# Patient Record
Sex: MALE | Race: WHITE | Employment: STUDENT | URBAN - NONMETROPOLITAN AREA
[De-identification: names, ages, dates, MRNs, and addresses within clinical notes are randomized per-mention and may not be internally consistent; named-entity substitution may affect disease eponyms.]

---

## 2018-01-24 ENCOUNTER — OFFICE VISIT - GICH (OUTPATIENT)
Dept: FAMILY MEDICINE | Facility: OTHER | Age: 23
End: 2018-01-24

## 2018-01-24 ENCOUNTER — HOSPITAL ENCOUNTER (OUTPATIENT)
Dept: RADIOLOGY | Facility: OTHER | Age: 23
End: 2018-01-24
Attending: NURSE PRACTITIONER

## 2018-01-24 ENCOUNTER — HISTORY (OUTPATIENT)
Dept: FAMILY MEDICINE | Facility: OTHER | Age: 23
End: 2018-01-24

## 2018-01-24 DIAGNOSIS — M25.522 PAIN IN LEFT ELBOW: ICD-10-CM

## 2018-01-24 DIAGNOSIS — S42.402A CLOSED FRACTURE OF LOWER END OF LEFT HUMERUS: ICD-10-CM

## 2018-01-24 DIAGNOSIS — S53.402A SPRAIN OF LEFT ELBOW: ICD-10-CM

## 2018-02-09 VITALS
RESPIRATION RATE: 16 BRPM | WEIGHT: 163 LBS | HEIGHT: 68 IN | TEMPERATURE: 98.1 F | SYSTOLIC BLOOD PRESSURE: 118 MMHG | DIASTOLIC BLOOD PRESSURE: 72 MMHG | BODY MASS INDEX: 24.71 KG/M2 | HEART RATE: 56 BPM

## 2018-02-13 NOTE — NURSING NOTE
Patient Information     Patient Name MRN Pablo Prasad [id number] Male 1995      Nursing Note by Chiquita López at 2018 12:00 PM     Author:  Chiquita López Service:  (none) Author Type:  (none)     Filed:  2018 12:30 PM Encounter Date:  2018 Status:  Signed     :  Chiquita López            Patient states injured left elbow yesterday. Hurts worse when moving.   Chiquita López LPN...................2018   12:03 PM

## 2018-02-13 NOTE — PROGRESS NOTES
"Patient Information     Patient Name MRN Sex Pablo Hickman [id number] Male 1995      Progress Notes by Patricia Salcedo NP at 2018 12:00 PM     Author:  Patricia Salcedo NP Service:  (none) Author Type:  PHYS- Nurse Practitioner     Filed:  2018  7:15 PM Encounter Date:  2018 Status:  Signed     :  Patricia Salcedo NP (PHYS- Nurse Practitioner)            Nursing Notes:   Chiquita López  2018 12:30 PM  Signed  Patient states injured left elbow yesterday. Hurts worse when moving.   Chiquita López LPN...................2018   12:03 PM     SUBJECTIVE:    Pablo Treviño is a 22 y.o. male who presents for elbow pain    Elbow Injury    Incident onset: 18. [place], arm hyperflexed and twisted). The injury mechanism was a direct blow and a fall. The pain is present in the left elbow and left forearm. The quality of the pain is described as burning. The pain does not radiate. The pain is at a severity of 3/10. Pertinent negatives include no numbness or tingling. The symptoms are aggravated by lifting, movement and palpation. He has tried ice for the symptoms. The treatment provided mild relief.       Current Outpatient Prescriptions on File Prior to Visit       Medication  Sig Dispense Refill     albuterol HFA (PROAIR HFA) 90 mcg/actuation inhaler INHALE 1 TO 2 PUFFS EVERY 4 TO 6 HOURS AS NEEDED. 8.5 g 0     No current facility-administered medications on file prior to visit.        REVIEW OF SYSTEMS:  Review of Systems   Neurological: Negative for tingling and numbness.       OBJECTIVE:  /72 (Cuff Site: Right Arm, Position: Sitting, Cuff Size: Adult Regular)  Pulse 56  Temp 98.1  F (36.7  C) (Tympanic)  Resp 16  Ht 1.727 m (5' 8\")  Wt 73.9 kg (163 lb)  BMI 24.78 kg/m2    EXAM:   Physical Exam   Constitutional: He is well-developed, well-nourished, and in no distress.   HENT:   Head: Normocephalic and " atraumatic.   Eyes: Conjunctivae are normal.   Neck: Neck supple.   Cardiovascular: Normal rate.    Pulmonary/Chest: Effort normal. No respiratory distress.   Musculoskeletal:        Left shoulder: Normal.        Left elbow: He exhibits decreased range of motion. He exhibits no swelling, no effusion and no deformity. Tenderness found. Medial epicondyle tenderness noted. No lateral epicondyle and no olecranon process tenderness noted.        Left wrist: Normal.        Left upper arm: Normal.        Left forearm: He exhibits tenderness.   Skin: Skin is warm and dry. No rash noted.   Psychiatric: Mood and affect normal.   Nursing note and vitals reviewed.    Completed Elbow xray.  I personally reviewed the xray. There was no obvious fracture noted upon initial read of xray.  Final read pending by radiology.    ASSESSMENT/PLAN:    ICD-10-CM    1. Elbow pain, left M25.522 XR ELBOW 3 VIEWS LEFT      SLING      AMB CONSULT TO ORTHOPEDICS (NON-SPINE)   2. Sprain of left elbow, initial encounter S53.402A naproxen (NAPROSYN) 500 mg tablet      SLING      AMB CONSULT TO ORTHOPEDICS (NON-SPINE)   3. Occult closed fracture of left elbow, initial encounter S42.402A AMB CONSULT TO ORTHOPEDICS (NON-SPINE)        Plan:  Radiology did note a potential for occult fracture. Patient was ace wrapped and placed in a sling. No movement until seen in orthopedics. Ice, rest, NSAIDs, F/u if needed sooner.       HARPREET GRIFFIN NP ....................  1/24/2018   7:14 PM

## 2018-02-13 NOTE — PATIENT INSTRUCTIONS
Patient Information     Patient Name MRN Sex Pablo Hickman [id number] Male 1995      Patient Instructions by Patricia Salcedo NP at 2018 12:00 PM     Author:  Patricia Salcedo NP Service:  (none) Author Type:  PHYS- Nurse Practitioner     Filed:  2018 12:55 PM Encounter Date:  2018 Status:  Signed     :  Patricia Salcedo NP (PHYS- Nurse Practitioner)            Elbow Sprain   ________________________________________________________________________  KEY POINTS    An elbow sprain is an injury to one or more of the ligaments in your elbow. Ligaments are strong bands of tissue that connect one bone to another to form the joints.    You will need to change or stop doing the activities that cause pain until the ligament has healed.    Treatment may include a sling or splint, stretching or strengthening exercises, or surgery.    Warm-up exercises and stretching before activities can help prevent injuries.  ________________________________________________________________________  What is an elbow sprain?   An elbow sprain is an injury to one or more of the ligaments in your elbow. Ligaments are strong bands of tissue that connect one bone to another to form the joints. When a ligament is injured, it can be stretched, partially torn, or completely torn.   What is the cause?  A sprain is caused by a sudden activity that twists or tears a ligament, like a fall onto your elbow or onto your outstretched arm.   What are the symptoms?   Symptoms may include:    Pain    Swelling and bruising    Trouble bending and straightening your elbow  How is it diagnosed?   Your healthcare provider will examine you and ask about your symptoms, activities, and medical history. You may have X-rays or other scans.  How is it treated?   You will need to change or stop doing the activities that cause pain until the ligament has healed.   Your healthcare provider may recommend stretching and strengthening  exercises to help you heal more quickly.   Your provider may give you a sling or splint to keep the elbow from moving while it heals.   If your elbow ligaments are completely torn, you may need surgery.  The pain often gets better within a few weeks with self-care, but some injuries may take several months or longer to heal. It s important to follow all of your healthcare provider s instructions.  How can I take care of myself?  To reduce swelling and pain for the first few days after the injury:    Put an ice pack, gel pack, or package of frozen vegetables wrapped in a cloth on the injured area every 3 to 4 hours for up to 20 minutes at a time.    Keep your elbow up on a pillow when you sit or lie down.    Take nonprescription pain medicine, such as acetaminophen, ibuprofen, or naproxen. Read the label and take as directed. Unless recommended by your healthcare provider, you should not take these medicines for more than 10 days.    Nonsteroidal anti-inflammatory medicines (NSAIDs), such as ibuprofen, naproxen, and aspirin, may cause stomach bleeding and other problems. These risks increase with age. Putting an NSAID gel on your skin can decrease pain, with fewer side effects than pills taken by mouth. Ask your healthcare provider if a prescription is right for you.    Acetaminophen may cause liver damage or other problems. Unless recommended by your provider, don't take more than 3000 milligrams (mg) in 24 hours. To make sure you don t take too much, check other medicines you take to see if they also contain acetaminophen. Ask your provider if you need to avoid drinking alcohol while taking this medicine.  Follow your healthcare provider's instructions, including any exercises recommended by your provider. Ask your provider:    How and when you will get your test results    How long it will take to recover    If there are activities you should avoid and when you can return to your normal activities    How to take  care of yourself at home    What symptoms or problems you should watch for and what to do if you have them  Make sure you know when you should come back for a checkup. Keep all appointments for provider visits or tests.  How can I help prevent an elbow sprain?   Warm-up exercises and stretching before activities can help prevent injuries.   Follow safety rules and use any protective equipment recommended for your work or sport.

## 2019-11-22 ENCOUNTER — OFFICE VISIT (OUTPATIENT)
Dept: FAMILY MEDICINE | Facility: OTHER | Age: 24
End: 2019-11-22
Attending: PHYSICIAN ASSISTANT
Payer: COMMERCIAL

## 2019-11-22 VITALS
HEART RATE: 70 BPM | DIASTOLIC BLOOD PRESSURE: 80 MMHG | WEIGHT: 196.8 LBS | SYSTOLIC BLOOD PRESSURE: 132 MMHG | HEIGHT: 69 IN | OXYGEN SATURATION: 98 % | BODY MASS INDEX: 29.15 KG/M2 | RESPIRATION RATE: 18 BRPM | TEMPERATURE: 98.5 F

## 2019-11-22 DIAGNOSIS — G89.29 CHRONIC MIDLINE LOW BACK PAIN WITHOUT SCIATICA: Primary | ICD-10-CM

## 2019-11-22 DIAGNOSIS — M54.50 CHRONIC MIDLINE LOW BACK PAIN WITHOUT SCIATICA: Primary | ICD-10-CM

## 2019-11-22 PROCEDURE — 99213 OFFICE O/P EST LOW 20 MIN: CPT | Performed by: FAMILY MEDICINE

## 2019-11-22 ASSESSMENT — PAIN SCALES - GENERAL: PAINLEVEL: MILD PAIN (2)

## 2019-11-22 ASSESSMENT — MIFFLIN-ST. JEOR: SCORE: 1878.06

## 2019-11-22 NOTE — PATIENT INSTRUCTIONS
Patient Education   When You Have Low Back Pain  Caring for Your Back  You are not alone.  Low back pain is very common. Nearly half of all adults have low back pain in any given year.  The good news is that back pain is rarely a danger to your health. Most people can manage their back pain on their own and about half of them start feeling better within 2 weeks. In 9 out of 10 cases, low back pain goes away or no longer limits daily activity within 6 weeks.  Your outlook is good!  Your symptoms tell us that your low back pain is most likely not a danger to you. Most of the time we do not know the exact cause of low back pain, even if you see a doctor or have an MRI. However, treatment can still work without knowing the cause of the pain. Less than 1 in 100 people need surgery for their back pain.  What can I do about my low back pain?  There are three things you can do to ease low back pain and help it go away.    Use heat or cold packs.    Take medicine as directed.    Use positions, movements and exercises.  Using heat or cold packs  Try cold packs or gentle heat to ease your pain. Use whichever gives you the most relief. Apply the cold pack or heat for 15 minutes at a time, as often as needed.  Taking medicine    If your doctor has prescribed medicine, be sure to follow the directions.    If you take over-the-counter medicine, read and follow the directions.    Talk to your doctor if you have any questions.  Using positions, movements and exercises  Research tells us that moving your joints and muscles can help you recover from back pain. Such activity should be simple and gentle.  Use the positions in the photos as well as walking to help relieve your pain. Try taking a short walk every 3 to 4 hours during the day. Walk for a few minutes inside your home or take longer walks outside, on a treadmill or at a mall. Slowly increase the amount of time you walk.  Expect discomfort when you begin, but it should  lessen as your back starts to heal. When your back feels better, walk daily to keep your back and body healthy.  Finding a comfortable position  When your back pain is new, certain positions will ease your pain. Gently try each of the positions below until you find one that is helpful. Once you find a position of comfort, use it as often as you like when you are resting. You will recover faster if you combine rest with activity.         When should I call my doctor?  Your back pain should improve over the first couple of weeks. As it improves, you should be able to return to your normal activities. But call your doctor if:    You have a sudden change in your ability to control?your bladder or bowels.    You feel tingling in your groin or legs.    The pain spreads down your leg and into your foot.    Your toes, feet or leg muscles feel weak.    You feel generally unwell or sick.    Your pain does not get better or gets worse.    For informational purposes only. Not to replace the advice of your health care provider.  Copyright   2013 [employer] [facility]. All rights reserved. [employer] [id number] - REV 03/16.

## 2019-11-22 NOTE — NURSING NOTE
"Chief Complaint   Patient presents with     Back Pain     Has scoliosis. Back pain has gotten worse yesterday and has been elevated since than. He usually has discomfort but this is more than normal.     Initial There were no vitals taken for this visit. Estimated body mass index is 24.78 kg/m  as calculated from the following:    Height as of 1/24/18: 1.727 m (5' 8\").    Weight as of 1/24/18: 73.9 kg (163 lb).    Medication Reconciliation: complete      Pablo Hannon LPN  "

## 2019-11-22 NOTE — PROGRESS NOTES
"Nursing Notes:   Pablo Hannon LPN  11/22/2019  1:54 PM  Signed  Chief Complaint   Patient presents with     Back Pain     Has scoliosis. Back pain has gotten worse yesterday and has been elevated since than. He usually has discomfort but this is more than normal.     Initial There were no vitals taken for this visit. Estimated body mass index is 24.78 kg/m  as calculated from the following:    Height as of 1/24/18: 1.727 m (5' 8\").    Weight as of 1/24/18: 73.9 kg (163 lb).    Medication Reconciliation: complete      Pablo APOLONIAFab Hannon LPN    SUBJECTIVE:   Pablo Treviño is a 24 year old male who complains of low back pain chronically and will often have an achy back which he attributes to being told he has scoliosis. No recent lifting and has not been exercising with weight gain noted.  Drives to [place] for school so spending lots of time in the car or sitting and studying.     Social History     Social History Narrative    Attending [employer] fall of 2019.         OBJECTIVE:  /80   Pulse 70   Temp 98.5  F (36.9  C) (Tympanic)   Resp 18   Ht 1.753 m (5' 9\")   Wt 89.3 kg (196 lb 12.8 oz)   SpO2 98%   BMI 29.06 kg/m     Patient appears to be in mild pain and moves without pain behaviors Lumbosacral spine area reveals no local tenderness or mass.  Painful and reduced LS ROM noted with forward flexion causing pain in area of upper lumbar spine centrally. NO obvious scoliosis or curvature.     ASSESSMENT:   1. Chronic midline low back pain without sciatica          PLAN:  For acute pain, rest, intermittent application of heat (do not sleep on heating pad), analgesics over the counter are recommended. Ibuprofen in adequate doses discussed.  Discussed longer term treatment plan of prn NSAID's and discussed a home back care exercise program with flexion exercise routine. Proper lifting with avoidance of heavy lifting discussed. Consider Physical Therapy or chiropractic care and XRay studies if not " improving. Call or return to clinic prn if these symptoms worsen or fail to improve as anticipated.    Kaia Davis MD  2:13 PM 11/22/2019

## 2020-01-09 ENCOUNTER — APPOINTMENT (OUTPATIENT)
Dept: GENERAL RADIOLOGY | Facility: HOSPITAL | Age: 25
End: 2020-01-09
Attending: EMERGENCY MEDICINE
Payer: COMMERCIAL

## 2020-01-09 ENCOUNTER — HOSPITAL ENCOUNTER (EMERGENCY)
Facility: HOSPITAL | Age: 25
Discharge: HOME | End: 2020-01-09
Attending: EMERGENCY MEDICINE | Admitting: EMERGENCY MEDICINE
Payer: COMMERCIAL

## 2020-01-09 VITALS
DIASTOLIC BLOOD PRESSURE: 83 MMHG | TEMPERATURE: 98.6 F | RESPIRATION RATE: 16 BRPM | WEIGHT: 190 LBS | HEART RATE: 94 BPM | OXYGEN SATURATION: 97 % | BODY MASS INDEX: 28.06 KG/M2 | SYSTOLIC BLOOD PRESSURE: 148 MMHG

## 2020-01-09 DIAGNOSIS — S52.322A CLOSED DISPLACED TRANSVERSE FRACTURE OF SHAFT OF LEFT RADIUS, INITIAL ENCOUNTER: Primary | ICD-10-CM

## 2020-01-09 PROCEDURE — 25505 CLTX RDL SHFT FX W/MNPJ: CPT | Mod: LT

## 2020-01-09 PROCEDURE — 99284 EMERGENCY DEPT VISIT MOD MDM: CPT | Mod: 25

## 2020-01-09 PROCEDURE — 73090 X-RAY EXAM OF FOREARM: CPT | Mod: TC,LT

## 2020-01-09 PROCEDURE — 25000125 ZZHC RX 250: Performed by: EMERGENCY MEDICINE

## 2020-01-09 PROCEDURE — 40000986 XR FOREARM PORT LT 2 VW: Mod: TC,LT

## 2020-01-09 PROCEDURE — 25505 CLTX RDL SHFT FX W/MNPJ: CPT | Mod: 54 | Performed by: EMERGENCY MEDICINE

## 2020-01-09 RX ORDER — ETOMIDATE 2 MG/ML
16 INJECTION INTRAVENOUS ONCE
Status: COMPLETED | OUTPATIENT
Start: 2020-01-09 | End: 2020-01-09

## 2020-01-09 RX ADMIN — ETOMIDATE 16 MG: 2 INJECTION, SOLUTION INTRAVENOUS at 20:26

## 2020-01-09 ASSESSMENT — ENCOUNTER SYMPTOMS
FEVER: 0
SHORTNESS OF BREATH: 0
ABDOMINAL PAIN: 0

## 2020-01-09 NOTE — ED AVS SNAPSHOT
[facility]  [address]  [place] [address]  Phone:  [phone]                                    Pablo Treviño   MRN: [id number]    Department:  [facility]   Date of Visit:  1/9/2020           After Visit Summary Signature Page    I have received my discharge instructions, and my questions have been answered. I have discussed any challenges I see with this plan with the nurse or doctor.    ..........................................................................................................................................  Patient/Patient Representative Signature      ..........................................................................................................................................  Patient Representative Print Name and Relationship to Patient    ..................................................               ................................................  Date                                   Time    ..........................................................................................................................................  Reviewed by Signature/Title    ...................................................              ..............................................  Date                                               Time          22EPIC Rev 08/18

## 2020-01-10 NOTE — ED TRIAGE NOTES
Pt was snowboarding at [facility] and was going over a jump and fell back and landed on outstretched left arm. Pt stated arm was moderately deformed before  put air cast on. Denies any LOC. Denies any neck pain.

## 2020-01-10 NOTE — ED PROVIDER NOTES
History     Chief Complaint   Patient presents with     Arm Pain     HPI  Pablo Treviño is a 24 year old male who presents to the emergency department with complaints of deformed painful left forearm after fall while skiing.  He fell on outstretched left arm while skiing.  No loss of consciousness and did not hit his head.  He is not on any blood thinners.  Denies any other injuries.    Allergies:  No Known Allergies    Problem List:    Patient Active Problem List    Diagnosis Date Noted     Chronic midline low back pain without sciatica 11/22/2019     Priority: Medium        Past Medical History:    No past medical history on file.    Past Surgical History:    No past surgical history on file.    Family History:    No family history on file.    Social History:  Marital Status:  Single [1]  Social History     Tobacco Use     Smoking status: Former Smoker     Smokeless tobacco: Former User     Types: Chew   Substance Use Topics     Alcohol use: Yes     Drug use: Not Currently     Types: Other     Comment: Drug use: No        Medications:    No current outpatient medications on file.        Review of Systems   Constitutional: Negative for fever.   Respiratory: Negative for shortness of breath.    Cardiovascular: Negative for chest pain.   Gastrointestinal: Negative for abdominal pain.   All other systems reviewed and are negative.      Physical Exam   BP: 131/96  Pulse: 107  Heart Rate: 93  Temp: 98.1  F (36.7  C)  Resp: 16  Weight: 86.2 kg (190 lb)  SpO2: 98 %      Physical Exam  Vitals signs and nursing note reviewed.   Constitutional:       General: He is not in acute distress.     Appearance: He is well-developed. He is not diaphoretic.   HENT:      Head: Normocephalic and atraumatic.   Eyes:      Pupils: Pupils are equal, round, and reactive to light.   Cardiovascular:      Rate and Rhythm: Normal rate and regular rhythm.      Heart sounds: Normal heart sounds.   Pulmonary:      Effort: Pulmonary effort is  normal. No respiratory distress.      Breath sounds: Normal breath sounds. No stridor.   Abdominal:      General: Bowel sounds are normal. There is no distension.      Tenderness: There is no abdominal tenderness.   Musculoskeletal:         General: Tenderness and deformity present.        Arms:    Neurological:      Mental Status: He is alert and oriented to person, place, and time.      Cranial Nerves: No cranial nerve deficit.         ED Course   Patient evaluated and x-rays ordered.  Offered pain medication but he declined preferring to wait and see results of the x-ray first.    [facility]    -Fracture  Date/Time: 1/9/2020 8:38 PM  Performed by: Porfirio Her MD  Authorized by: Porfirio Her MD       INJURY      Injury location:  Forearm    Forearm injury location:  L forearm    Forearm fracture type: radial shaft      PRE PROCEDURE ASSESSMENT      Neurological function: normal      Distal perfusion: normal      Range of motion: reduced      PROCEDURE DETAILS:     Manipulation performed: yes      Skeletal traction used: yes      Reduction successful: yes      X-ray confirmed reduction: yes      Immobilization:  Sling    Supplies used:  Cotton padding and plaster    POST PROCEDURE ASSESSMENT      Neurological function: normal      Distal perfusion: normal      Range of motion: improved      PROCEDURE   Patient Tolerance:  Patient tolerated the procedure well with no immediate complications          Results for orders placed or performed during the hospital encounter of 01/09/20 (from the past 24 hour(s))   XR Forearm Port Left 2 Views    Narrative    PROCEDURE:  XR FOREARM PORT LT 2 VW    HISTORY: Deformed left forearm after fall.    COMPARISON:  None.    TECHNIQUE:  2 views of the left forearm were obtained.    FINDINGS:  There is a transverse fracture distal third of the radial  shaft. Major distal fracture fragment is displaced toward the radial  aspect of the wrist by 7 mm. The ulna is  intact.       Impression    IMPRESSION: Fracture distal third of the radial shaft      DIYA LEMUS MD   XR Forearm Port Left 2 Views    Narrative    PROCEDURE:  XR FOREARM PORT LT 2 VW    HISTORY: post reduction    COMPARISON:  None.    TECHNIQUE:  1 views of the left forearm were obtained.    FINDINGS:  There Is a transverse fracture of the distal third of the  radial shaft. The major distal fracture fragment is displaced toward  the radial aspect of the wrist by 4 mm.       Impression    IMPRESSION: Distal radial fracture      DIYA LEMUS MD       Medications   etomidate (AMIDATE) injection 16 mg (16 mg Intravenous Given 1/9/20 2026)       Assessments & Plan (with Medical Decision Making)   Closed displaced transverse fracture of the shaft of left radius: Sustained fracture of the left radius shaft after falling while skiing.  X-rays confirm the fracture.  Reduced under conscious sedation as stated above in procedure note and cast applied.  Follow-up with orthopedics as outpatient.  Use OTC Motrin or Tylenol as needed for pain.  Arm sling applied.  Discharged home in stable condition.    I have reviewed the nursing notes.    I have reviewed the findings, diagnosis, plan and need for follow up with the patient.    There are no discharge medications for this patient.      Final diagnoses:   Closed displaced transverse fracture of shaft of left radius, initial encounter       1/9/2020   [facility]     Porfirio Her MD  01/10/20 [id number]

## 2020-01-10 NOTE — ED NOTES
Pt to the EDafter snowboard accident where he wasgoing over a hill and fell back landing on outstretched left arm.  Pt has obvious LFA deformity.  Arrived with air cast per [facility] staff.  Pt has adequate CMS. No LOC. Did not hit head. Denies head and neck pain.  Assessment is complete.  Call light is given

## 2020-01-10 NOTE — ED NOTES
"MD states pt may discharge 20 minutes after medication given. Pt reports he is feeling \"completely normal\".  "